# Patient Record
Sex: MALE | HISPANIC OR LATINO | Employment: FULL TIME | URBAN - METROPOLITAN AREA
[De-identification: names, ages, dates, MRNs, and addresses within clinical notes are randomized per-mention and may not be internally consistent; named-entity substitution may affect disease eponyms.]

---

## 2019-01-15 ENCOUNTER — OFFICE VISIT (OUTPATIENT)
Dept: URGENT CARE | Facility: PHYSICIAN GROUP | Age: 34
End: 2019-01-15
Payer: COMMERCIAL

## 2019-01-15 VITALS
RESPIRATION RATE: 18 BRPM | WEIGHT: 259 LBS | HEIGHT: 71 IN | OXYGEN SATURATION: 97 % | HEART RATE: 88 BPM | BODY MASS INDEX: 36.26 KG/M2 | TEMPERATURE: 97.9 F

## 2019-01-15 DIAGNOSIS — R51.9 ACUTE NONINTRACTABLE HEADACHE, UNSPECIFIED HEADACHE TYPE: ICD-10-CM

## 2019-01-15 DIAGNOSIS — J01.10 ACUTE NON-RECURRENT FRONTAL SINUSITIS: ICD-10-CM

## 2019-01-15 PROCEDURE — 99204 OFFICE O/P NEW MOD 45 MIN: CPT | Performed by: FAMILY MEDICINE

## 2019-01-15 RX ORDER — DIPHENHYDRAMINE HYDROCHLORIDE 50 MG/ML
25 INJECTION INTRAMUSCULAR; INTRAVENOUS ONCE
Status: COMPLETED | OUTPATIENT
Start: 2019-01-15 | End: 2019-01-15

## 2019-01-15 RX ORDER — ACETAMINOPHEN 500 MG
500-1000 TABLET ORAL EVERY 6 HOURS PRN
COMMUNITY
End: 2024-02-04

## 2019-01-15 RX ORDER — KETOROLAC TROMETHAMINE 30 MG/ML
60 INJECTION, SOLUTION INTRAMUSCULAR; INTRAVENOUS ONCE
Status: COMPLETED | OUTPATIENT
Start: 2019-01-15 | End: 2019-01-15

## 2019-01-15 RX ORDER — AMOXICILLIN AND CLAVULANATE POTASSIUM 875; 125 MG/1; MG/1
1 TABLET, FILM COATED ORAL 2 TIMES DAILY
Qty: 14 TAB | Refills: 0 | Status: SHIPPED | OUTPATIENT
Start: 2019-01-15 | End: 2019-01-22

## 2019-01-15 RX ORDER — IBUPROFEN 800 MG/1
800 TABLET ORAL EVERY 8 HOURS PRN
COMMUNITY
End: 2024-02-04

## 2019-01-15 RX ADMIN — KETOROLAC TROMETHAMINE 60 MG: 30 INJECTION, SOLUTION INTRAMUSCULAR; INTRAVENOUS at 10:24

## 2019-01-15 RX ADMIN — DIPHENHYDRAMINE HYDROCHLORIDE 25 MG: 50 INJECTION INTRAMUSCULAR; INTRAVENOUS at 10:23

## 2019-01-15 ASSESSMENT — ENCOUNTER SYMPTOMS
CHILLS: 0
SINUS PRESSURE: 1
BLURRED VISION: 0
FEVER: 0
SORE THROAT: 0
NAUSEA: 0
EYE WATERING: 0
INSOMNIA: 0
EYE REDNESS: 0
LOSS OF BALANCE: 0
SHORTNESS OF BREATH: 0
HEADACHES: 1
DIZZINESS: 0
EYE PAIN: 1
VOMITING: 0
MYALGIAS: 0

## 2019-01-15 NOTE — PROGRESS NOTES
Subjective:     Bao Savage is a 33 y.o. male who presents for Headache (R eye [id number])       Headache    This is a new problem. The current episode started in the past 7 days. The problem occurs constantly. The problem has been rapidly worsening. The pain is located in the right unilateral region. The quality of the pain is described as aching, pulsating and dull. The pain is severe. Associated symptoms include eye pain and sinus pressure. Pertinent negatives include no blurred vision, dizziness, drainage, ear pain, eye redness, eye watering, fever, hearing loss, insomnia, loss of balance, nausea, sore throat or vomiting.   History reviewed. No pertinent past medical history.History reviewed. No pertinent surgical history.  Social History     Social History   • Marital status:      Spouse name: N/A   • Number of children: N/A   • Years of education: N/A     Occupational History   • Not on file.     Social History Main Topics   • Smoking status: Current Every Day Smoker     Types: Cigarettes   • Smokeless tobacco: Never Used   • Alcohol use Yes   • Drug use: No   • Sexual activity: Not on file     Other Topics Concern   • Not on file     Social History Narrative   • No narrative on file      Family History   Problem Relation Age of Onset   • Heart Disease Mother    • Hypertension Mother    • Diabetes Mother    • Stroke Neg Hx     Review of Systems   Constitutional: Negative for chills and fever.   HENT: Positive for sinus pressure. Negative for ear pain, hearing loss and sore throat.    Eyes: Positive for pain. Negative for blurred vision and redness.   Respiratory: Negative for shortness of breath.    Cardiovascular: Negative for chest pain.   Gastrointestinal: Negative for nausea and vomiting.   Genitourinary: Negative for hematuria.   Musculoskeletal: Negative for myalgias.   Skin: Negative for rash.   Neurological: Positive for headaches. Negative for dizziness and loss of balance.  "  Psychiatric/Behavioral: The patient does not have insomnia.    No Known Allergies   Objective:   Pulse 88   Temp 36.6 °C (97.9 °F) (Temporal)   Resp 18   Ht 1.803 m (5' 11\")   Wt 117.5 kg (259 lb)   SpO2 97%   BMI 36.12 kg/m²   Physical Exam   Constitutional: He is oriented to person, place, and time. He appears well-developed and well-nourished. No distress.   HENT:   Head: Normocephalic and atraumatic.   Nose: Mucosal edema and rhinorrhea present. Right sinus exhibits maxillary sinus tenderness. Left sinus exhibits maxillary sinus tenderness.   Eyes: Pupils are equal, round, and reactive to light. Conjunctivae and EOM are normal.   Cardiovascular: Normal rate and regular rhythm.    No murmur heard.  Pulmonary/Chest: Effort normal and breath sounds normal. No respiratory distress.   Abdominal: Soft. He exhibits no distension. There is no tenderness.   Neurological: He is alert and oriented to person, place, and time. He has normal reflexes. No sensory deficit.   Skin: Skin is warm and dry.   Psychiatric: He has a normal mood and affect.   Vitals reviewed.        Assessment/Plan:   Assessment    1. Acute non-recurrent frontal sinusitis  - amoxicillin-clavulanate (AUGMENTIN) 875-125 MG Tab; Take 1 Tab by mouth 2 times a day for 7 days.  Dispense: 14 Tab; Refill: 0    2. Acute nonintractable headache, unspecified headache type  - ketorolac (TORADOL) injection 60 mg; 2 mL by Intramuscular route Once.  - prochlorperazine (COMPAZINE) injection 5 mg; 1 mL by Intramuscular route Once.  - diphenhydrAMINE (BENADRYL) injection 25 mg; 0.5 mL by Intravenous route Once.    Other orders  - asa/apap/caffeine (EXCEDRIN) 250-250-65 MG Tab; Take 1 Tab by mouth every 6 hours as needed for Headache.  - ibuprofen (MOTRIN) 800 MG Tab; Take 800 mg by mouth every 8 hours as needed.  - acetaminophen (TYLENOL) 500 MG Tab; Take 500-1,000 mg by mouth every 6 hours as needed.    Differential diagnosis, natural history, supportive care, " and indications for immediate follow-up discussed.

## 2019-01-15 NOTE — LETTER
January 15, 2019         Patient: Bao Savage   YOB: 1985   Date of Visit: 1/15/2019           To Whom it May Concern:    Bao Savage was seen in my clinic on 1/15/2019. He may return to work on 1/18/2019 unless improved prior..    If you have any questions or concerns, please don't hesitate to call.        Sincerely,           Korey Carrington M.D.  Electronically Signed

## 2019-01-15 NOTE — PATIENT INSTRUCTIONS

## 2019-02-18 ENCOUNTER — OFFICE VISIT (OUTPATIENT)
Dept: URGENT CARE | Facility: PHYSICIAN GROUP | Age: 34
End: 2019-02-18
Payer: COMMERCIAL

## 2019-02-18 VITALS
BODY MASS INDEX: 35.84 KG/M2 | RESPIRATION RATE: 20 BRPM | TEMPERATURE: 98 F | SYSTOLIC BLOOD PRESSURE: 124 MMHG | DIASTOLIC BLOOD PRESSURE: 80 MMHG | WEIGHT: 257 LBS | HEART RATE: 87 BPM | OXYGEN SATURATION: 96 %

## 2019-02-18 DIAGNOSIS — K64.5 THROMBOSED HEMORRHOIDS: ICD-10-CM

## 2019-02-18 PROCEDURE — 99213 OFFICE O/P EST LOW 20 MIN: CPT | Performed by: FAMILY MEDICINE

## 2019-02-18 RX ORDER — LIDOCAINE HCL 3 %
LOTION (ML) TOPICAL
Qty: 1 TUBE | Refills: 1 | Status: SHIPPED | OUTPATIENT
Start: 2019-02-18 | End: 2019-02-18

## 2019-02-18 RX ORDER — LIDOCAINE HCL 3 %
LOTION (ML) TOPICAL
Qty: 1 TUBE | Refills: 1 | Status: SHIPPED | OUTPATIENT
Start: 2019-02-18 | End: 2024-02-04

## 2019-02-18 ASSESSMENT — ENCOUNTER SYMPTOMS
WEIGHT LOSS: 0
FOCAL WEAKNESS: 0
EYE REDNESS: 0
EYE DISCHARGE: 0
SENSORY CHANGE: 0

## 2019-02-19 NOTE — PROGRESS NOTES
Subjective:      Bao Savage is a 33 y.o. male who presents with Anal Irritation (x7 days, bleeding started a week ago, discomfort started 2 weeks ago )            2 weeks anal pain and bleeding. Perianal bump. No fever. No purulent drainage. No difficulty with BM but bright red blood associated with BM. No itching. No PMH hemorrhoid.  No over-the-counter medications.  No other aggravating or alleviating factors.          Review of Systems   Constitutional: Negative for malaise/fatigue and weight loss.   Eyes: Negative for discharge and redness.   Skin: Negative for itching and rash.   Neurological: Negative for sensory change and focal weakness.     .  Medications, Allergies, and current problem list reviewed today in Epic       Objective:     /80   Pulse 87   Temp 36.7 °C (98 °F)   Resp 20   Wt 116.6 kg (257 lb)   SpO2 96%   BMI 35.84 kg/m²      Physical Exam   Constitutional: He is oriented to person, place, and time. He appears well-developed and well-nourished. No distress.   HENT:   Head: Normocephalic and atraumatic.   Genitourinary:   Genitourinary Comments: Perianal thrombosed hemorrhoid from 9 to 11:00.  Hemorrhoid has already started to open.  Anesthesia with 2% lidocaine with epi and then thrombus expressed.  Patient experienced relief.  No active bleeding.   Neurological: He is alert and oriented to person, place, and time.   Skin: Skin is warm and dry. No rash noted.               Assessment/Plan:     1. Thrombosed hemorrhoids  Lidocaine HCl 3 % Lotion    DISCONTINUED: Lidocaine HCl 3 % Lotion     Differential diagnosis, natural history, supportive care, and indications for immediate follow-up discussed at length.

## 2019-06-12 ENCOUNTER — OFFICE VISIT (OUTPATIENT)
Dept: URGENT CARE | Facility: PHYSICIAN GROUP | Age: 34
End: 2019-06-12
Payer: COMMERCIAL

## 2019-06-12 VITALS
DIASTOLIC BLOOD PRESSURE: 78 MMHG | BODY MASS INDEX: 36.4 KG/M2 | RESPIRATION RATE: 16 BRPM | HEART RATE: 77 BPM | TEMPERATURE: 99 F | OXYGEN SATURATION: 96 % | WEIGHT: 260 LBS | HEIGHT: 71 IN | SYSTOLIC BLOOD PRESSURE: 126 MMHG

## 2019-06-12 DIAGNOSIS — L02.412 ABSCESS OF LEFT AXILLA: ICD-10-CM

## 2019-06-12 DIAGNOSIS — J06.9 URI, ACUTE: ICD-10-CM

## 2019-06-12 DIAGNOSIS — R09.81 SINUS CONGESTION: ICD-10-CM

## 2019-06-12 PROCEDURE — 99214 OFFICE O/P EST MOD 30 MIN: CPT | Performed by: PHYSICIAN ASSISTANT

## 2019-06-12 RX ORDER — SULFAMETHOXAZOLE AND TRIMETHOPRIM 800; 160 MG/1; MG/1
1 TABLET ORAL 2 TIMES DAILY
Qty: 20 TAB | Refills: 0 | Status: SHIPPED | OUTPATIENT
Start: 2019-06-12 | End: 2024-02-04

## 2019-06-12 ASSESSMENT — ENCOUNTER SYMPTOMS: COUGH: 1

## 2019-06-12 NOTE — PROGRESS NOTES
Subjective:      Bao Savage is a 33 y.o. male who presents with Cough (congestion x 4 days) and Skin Lesion (in L axilla x 7 days)    PMH:  has no past medical history of Asthma; Cancer (HCC); Diabetes (HCC); Hyperlipidemia; or Hypertension.  MEDS:   Current Outpatient Prescriptions:   •  Pseudoeph-Doxylamine-DM-APAP (NYQUIL MULTI-SYMPTOM PO), Take  by mouth., Disp: , Rfl:   •  Lidocaine HCl 3 % Lotion, Apply to affected area every 4 hours as needed (Patient not taking: Reported on 6/12/2019), Disp: 1 Tube, Rfl: 1  •  asa/apap/caffeine (EXCEDRIN) 250-250-65 MG Tab, Take 1 Tab by mouth every 6 hours as needed for Headache., Disp: , Rfl:   •  ibuprofen (MOTRIN) 800 MG Tab, Take 800 mg by mouth every 8 hours as needed., Disp: , Rfl:   •  acetaminophen (TYLENOL) 500 MG Tab, Take 500-1,000 mg by mouth every 6 hours as needed., Disp: , Rfl:   ALLERGIES: No Known Allergies  SURGHX: No past surgical history on file.  SOCHX:  reports that he has been smoking Cigarettes.  He has never used smokeless tobacco. He reports that he drinks alcohol. He reports that he does not use drugs.  FH: Reviewed with patient, not pertinent to this visit.             Patient presents with:  Cough: congestion x 4 days  Skin Lesion: in L axilla x 7 days, draining on its own.  PT states he has had this pimple-like bump under his arm on and off for a year.  Patient states it drains occasionally on its own then goes away.  Patient states this is the longest it has lasted wanted to have it evaluated.      Patient denies fever, chills, nausea vomiting or diarrhea.      Cough   This is a new problem. The current episode started in the past 7 days. The problem has been unchanged. The problem occurs every few minutes. The cough is non-productive. Associated symptoms include headaches, nasal congestion, postnasal drip and rhinorrhea. Pertinent negatives include no chills, ear pain, fever, sore throat, shortness of breath or wheezing. The symptoms  "are aggravated by lying down. He has tried body position changes, OTC cough suppressant and rest for the symptoms. The treatment provided mild relief. There is no history of asthma or pneumonia.   Cyst   This is a new problem. The current episode started in the past 7 days. The problem occurs constantly. The problem has been gradually worsening. Associated symptoms include congestion, coughing and headaches. Pertinent negatives include no chills, fever or sore throat. Nothing aggravates the symptoms. He has tried nothing for the symptoms. The treatment provided no relief.       Review of Systems   Constitutional: Negative for chills and fever.   HENT: Positive for congestion, postnasal drip and rhinorrhea. Negative for ear pain and sore throat.    Respiratory: Positive for cough. Negative for sputum production, shortness of breath and wheezing.    Neurological: Positive for headaches.   All other systems reviewed and are negative.         Objective:     /78   Pulse 77   Temp 37.2 °C (99 °F)   Resp 16   Ht 1.803 m (5' 11\")   Wt 117.9 kg (260 lb)   SpO2 96%   BMI 36.26 kg/m²      Physical Exam   Constitutional: He is oriented to person, place, and time. He appears well-developed and well-nourished. No distress.   HENT:   Head: Normocephalic and atraumatic.   Right Ear: Tympanic membrane normal.   Left Ear: Tympanic membrane normal.   Nose: Mucosal edema and rhinorrhea present.   Mouth/Throat: Uvula is midline. Posterior oropharyngeal erythema present. No posterior oropharyngeal edema.   Eyes: Pupils are equal, round, and reactive to light. Conjunctivae and EOM are normal.   Neck: Normal range of motion. Neck supple.   Cardiovascular: Normal rate, regular rhythm and normal heart sounds.    Pulmonary/Chest: Effort normal and breath sounds normal. He has no decreased breath sounds.   Abdominal: Soft.   Musculoskeletal: Normal range of motion.   Lymphadenopathy:     He has no cervical adenopathy. "   Neurological: He is alert and oriented to person, place, and time. Gait normal.   Skin: Skin is warm and dry. Capillary refill takes less than 2 seconds.        Psychiatric: He has a normal mood and affect.   Nursing note and vitals reviewed.              Assessment/Plan:     1. Abscess of left axilla  sulfamethoxazole-trimethoprim (BACTRIM DS) 800-160 MG tablet   2. Sinus congestion  sulfamethoxazole-trimethoprim (BACTRIM DS) 800-160 MG tablet   3. URI, acute  sulfamethoxazole-trimethoprim (BACTRIM DS) 800-160 MG tablet     Discussed that I felt his cough/sinus congestion was viral in nature. Did not see any evidence of a bacterial process. Discussed natural progression and sx care.    PT can continue OTC medications, increase fluids and rest until symptoms improve.     Bactrim DS for axillary abscess. PT can use warm compress on affected area to encourage drainage.        PT should follow up with PCP in 1-2 days for re-evaluation if symptoms have not improved.  Discussed red flags and reasons to return to UC or ED.  Pt and/or family verbalized understanding of diagnosis and follow up instructions and was offered informational handout on diagnosis.  PT discharged.

## 2019-06-12 NOTE — LETTER
June 12, 2019         Patient: Bao Savage   YOB: 1985   Date of Visit: 6/12/2019           To Whom it May Concern:    Bao Savage was seen in my clinic on 6/12/2019. He may return to work on 6/13/2019.    If you have any questions or concerns, please don't hesitate to call.        Sincerely,           Mary Nina P.A.-C.  Electronically Signed

## 2019-06-15 ASSESSMENT — ENCOUNTER SYMPTOMS
CHILLS: 0
SORE THROAT: 0
SHORTNESS OF BREATH: 0
RHINORRHEA: 1
HEADACHES: 1
FEVER: 0
SPUTUM PRODUCTION: 0
WHEEZING: 0

## 2024-02-04 ENCOUNTER — OFFICE VISIT (OUTPATIENT)
Dept: URGENT CARE | Facility: PHYSICIAN GROUP | Age: 39
End: 2024-02-04
Payer: COMMERCIAL

## 2024-02-04 VITALS
WEIGHT: 268 LBS | DIASTOLIC BLOOD PRESSURE: 70 MMHG | BODY MASS INDEX: 38.37 KG/M2 | SYSTOLIC BLOOD PRESSURE: 130 MMHG | HEART RATE: 100 BPM | OXYGEN SATURATION: 97 % | TEMPERATURE: 99.3 F | RESPIRATION RATE: 16 BRPM | HEIGHT: 70 IN

## 2024-02-04 DIAGNOSIS — B96.89 BACTERIAL SINUSITIS: ICD-10-CM

## 2024-02-04 DIAGNOSIS — J32.9 BACTERIAL SINUSITIS: ICD-10-CM

## 2024-02-04 DIAGNOSIS — L02.419 ABSCESS, AXILLA: ICD-10-CM

## 2024-02-04 PROCEDURE — 99203 OFFICE O/P NEW LOW 30 MIN: CPT | Performed by: FAMILY MEDICINE

## 2024-02-04 PROCEDURE — 3075F SYST BP GE 130 - 139MM HG: CPT | Performed by: FAMILY MEDICINE

## 2024-02-04 PROCEDURE — 3078F DIAST BP <80 MM HG: CPT | Performed by: FAMILY MEDICINE

## 2024-02-04 RX ORDER — DOXYCYCLINE HYCLATE 100 MG
100 TABLET ORAL 2 TIMES DAILY
Qty: 14 TABLET | Refills: 0 | Status: SHIPPED | OUTPATIENT
Start: 2024-02-04 | End: 2024-02-11

## 2024-02-04 NOTE — PROGRESS NOTES
"  Subjective:      38 y.o. male presents to urgent care for cold symptoms that started about 2 weeks ago. He is experiencing headache increased sinus pressure, and cough. No sore throat. No tobacco product use. No history of asthma or COPD. He is vaccinated against COVID.     About 3 days ago he developed pain to his left axilla. He does have history of intermittent abscess to that area ,last drained in 2019. The pain is constant, it's described as throbbing, currently rated 4/10. There is associated discharge. He has been using cold and flu medication.      He denies any other questions or concerns at this time.    Current problem list, medication, and past medical/surgical history were reviewed in Epic.    ROS  See HPI     Objective:      /70   Pulse 100   Temp 37.4 °C (99.3 °F) (Temporal)   Resp 16   Ht 1.778 m (5' 10\")   Wt 122 kg (268 lb)   SpO2 97%   BMI 38.45 kg/m²     Physical Exam  Constitutional:       General: He is not in acute distress.     Appearance: He is not diaphoretic.   HENT:      Right Ear: Tympanic membrane, ear canal and external ear normal.      Left Ear: Tympanic membrane, ear canal and external ear normal.      Nose:      Right Sinus: Maxillary sinus tenderness present. No frontal sinus tenderness.      Left Sinus: Maxillary sinus tenderness present. No frontal sinus tenderness.      Mouth/Throat:      Tongue: Tongue does not deviate from midline.      Palate: No lesions.      Pharynx: Uvula midline. No oropharyngeal exudate or posterior oropharyngeal erythema.      Tonsils: No tonsillar exudate. 1+ on the right. 1+ on the left.   Cardiovascular:      Rate and Rhythm: Normal rate and regular rhythm.      Heart sounds: Normal heart sounds.   Pulmonary:      Effort: Pulmonary effort is normal. No respiratory distress.      Breath sounds: Normal breath sounds.   Skin:     Comments: Small open area to left axilla that is draining purulent matter.  No underlying area of induration.  " No surrounding erythema or edema.   Neurological:      Mental Status: He is alert.   Psychiatric:         Mood and Affect: Affect normal.         Judgment: Judgment normal.       Assessment/Plan:     1. Bacterial sinusitis  Criteria for bacterial sinusitis has been met.  Given the skin infection, I have sent in a prescription for doxycycline rather than Augmentin.  - doxycycline (VIBRAMYCIN) 100 MG Tab; Take 1 Tablet by mouth 2 times a day for 7 days.  Dispense: 14 Tablet; Refill: 0    2. Abscess, axilla  At this point, I do not believe he would benefit from incision and drainage.  Area is already open, no substantial palpable abscess beneath this.  He was started on doxycycline.  He was encouraged to use warm compresses.  Tylenol and ibuprofen as needed.  - doxycycline (VIBRAMYCIN) 100 MG Tab; Take 1 Tablet by mouth 2 times a day for 7 days.  Dispense: 14 Tablet; Refill: 0      Instructed to return to Urgent Care or nearest Emergency Department if symptoms fail to improve, for any change in condition, further concerns, or new concerning symptoms. Patient states understanding of the plan of care and discharge instructions.    Wendy Ding M.D.

## 2024-07-03 ENCOUNTER — OFFICE VISIT (OUTPATIENT)
Dept: MEDICAL GROUP | Facility: MEDICAL CENTER | Age: 39
End: 2024-07-03
Payer: COMMERCIAL

## 2024-07-03 VITALS
OXYGEN SATURATION: 95 % | SYSTOLIC BLOOD PRESSURE: 108 MMHG | WEIGHT: 264.11 LBS | DIASTOLIC BLOOD PRESSURE: 80 MMHG | TEMPERATURE: 98.1 F | HEART RATE: 63 BPM | BODY MASS INDEX: 37.81 KG/M2 | HEIGHT: 70 IN

## 2024-07-03 DIAGNOSIS — L73.2 HIDRADENITIS SUPPURATIVA OF LEFT AXILLA: ICD-10-CM

## 2024-07-03 DIAGNOSIS — L73.2: ICD-10-CM

## 2024-07-03 DIAGNOSIS — N50.811 TESTICULAR PAIN, RIGHT: ICD-10-CM

## 2024-07-03 DIAGNOSIS — E66.9 OBESITY (BMI 30-39.9): ICD-10-CM

## 2024-07-03 DIAGNOSIS — R21 RASH AND NONSPECIFIC SKIN ERUPTION: ICD-10-CM

## 2024-07-03 PROCEDURE — 3079F DIAST BP 80-89 MM HG: CPT | Performed by: STUDENT IN AN ORGANIZED HEALTH CARE EDUCATION/TRAINING PROGRAM

## 2024-07-03 PROCEDURE — 3074F SYST BP LT 130 MM HG: CPT | Performed by: STUDENT IN AN ORGANIZED HEALTH CARE EDUCATION/TRAINING PROGRAM

## 2024-07-03 PROCEDURE — 99214 OFFICE O/P EST MOD 30 MIN: CPT | Performed by: STUDENT IN AN ORGANIZED HEALTH CARE EDUCATION/TRAINING PROGRAM

## 2024-07-03 RX ORDER — TRIAMCINOLONE ACETONIDE 1 MG/G
1 CREAM TOPICAL 2 TIMES DAILY
Qty: 45 G | Refills: 1 | Status: SHIPPED | OUTPATIENT
Start: 2024-07-03

## 2024-07-03 RX ORDER — DOXYCYCLINE HYCLATE 100 MG
100 TABLET ORAL 2 TIMES DAILY
Qty: 180 TABLET | Refills: 1 | Status: SHIPPED | OUTPATIENT
Start: 2024-07-03

## 2024-07-03 RX ORDER — METFORMIN HYDROCHLORIDE 500 MG/1
500 TABLET, EXTENDED RELEASE ORAL DAILY
Qty: 90 TABLET | Refills: 3 | Status: SHIPPED | OUTPATIENT
Start: 2024-07-03

## 2024-07-03 RX ORDER — CLINDAMYCIN PHOSPHATE 10 MG/G
1 GEL TOPICAL 2 TIMES DAILY
Qty: 60 G | Refills: 5 | Status: SHIPPED | OUTPATIENT
Start: 2024-07-03

## 2024-07-03 ASSESSMENT — PATIENT HEALTH QUESTIONNAIRE - PHQ9: CLINICAL INTERPRETATION OF PHQ2 SCORE: 0

## 2024-07-03 ASSESSMENT — ENCOUNTER SYMPTOMS
HEADACHES: 0
NAUSEA: 0
VOMITING: 0
WHEEZING: 0
PALPITATIONS: 0
FEVER: 0
WEIGHT LOSS: 0
DIZZINESS: 0
CHILLS: 0
SHORTNESS OF BREATH: 0

## 2024-07-08 ENCOUNTER — OFFICE VISIT (OUTPATIENT)
Dept: DERMATOLOGY | Facility: IMAGING CENTER | Age: 39
End: 2024-07-08
Payer: COMMERCIAL

## 2024-07-08 DIAGNOSIS — B07.9 VERRUCA: ICD-10-CM

## 2024-07-08 DIAGNOSIS — L73.2 HIDRADENITIS: ICD-10-CM

## 2024-07-08 PROCEDURE — 17110 DESTRUCTION B9 LES UP TO 14: CPT | Performed by: STUDENT IN AN ORGANIZED HEALTH CARE EDUCATION/TRAINING PROGRAM

## 2024-07-08 PROCEDURE — 99204 OFFICE O/P NEW MOD 45 MIN: CPT | Mod: 25 | Performed by: STUDENT IN AN ORGANIZED HEALTH CARE EDUCATION/TRAINING PROGRAM

## 2024-07-08 PROCEDURE — 11900 INJECT SKIN LESIONS </W 7: CPT | Mod: 59 | Performed by: STUDENT IN AN ORGANIZED HEALTH CARE EDUCATION/TRAINING PROGRAM

## 2024-07-08 RX ORDER — TRIAMCINOLONE ACETONIDE 40 MG/ML
40 INJECTION, SUSPENSION INTRA-ARTICULAR; INTRAMUSCULAR ONCE
Status: COMPLETED | OUTPATIENT
Start: 2024-07-08 | End: 2024-07-08

## 2024-07-08 RX ORDER — IMIQUIMOD 12.5 MG/.25G
CREAM TOPICAL
Qty: 24 EACH | Refills: 0 | Status: SHIPPED | OUTPATIENT
Start: 2024-07-08

## 2024-07-08 RX ADMIN — TRIAMCINOLONE ACETONIDE 40 MG: 40 INJECTION, SUSPENSION INTRA-ARTICULAR; INTRAMUSCULAR at 17:14

## 2024-07-20 ENCOUNTER — HOSPITAL ENCOUNTER (OUTPATIENT)
Dept: LAB | Facility: MEDICAL CENTER | Age: 39
End: 2024-07-20
Attending: STUDENT IN AN ORGANIZED HEALTH CARE EDUCATION/TRAINING PROGRAM
Payer: COMMERCIAL

## 2024-07-20 DIAGNOSIS — E66.9 OBESITY (BMI 30-39.9): ICD-10-CM

## 2024-07-20 LAB
ALBUMIN SERPL BCP-MCNC: 4.3 G/DL (ref 3.2–4.9)
ALBUMIN/GLOB SERPL: 1.4 G/DL
ALP SERPL-CCNC: 87 U/L (ref 30–99)
ALT SERPL-CCNC: 30 U/L (ref 2–50)
ANION GAP SERPL CALC-SCNC: 11 MMOL/L (ref 7–16)
AST SERPL-CCNC: 12 U/L (ref 12–45)
BILIRUB SERPL-MCNC: 1.1 MG/DL (ref 0.1–1.5)
BUN SERPL-MCNC: 16 MG/DL (ref 8–22)
CALCIUM ALBUM COR SERPL-MCNC: 9 MG/DL (ref 8.5–10.5)
CALCIUM SERPL-MCNC: 9.2 MG/DL (ref 8.5–10.5)
CHLORIDE SERPL-SCNC: 99 MMOL/L (ref 96–112)
CHOLEST SERPL-MCNC: 194 MG/DL (ref 100–199)
CO2 SERPL-SCNC: 27 MMOL/L (ref 20–33)
CREAT SERPL-MCNC: 0.63 MG/DL (ref 0.5–1.4)
ERYTHROCYTE [DISTWIDTH] IN BLOOD BY AUTOMATED COUNT: 43.1 FL (ref 35.9–50)
EST. AVERAGE GLUCOSE BLD GHB EST-MCNC: 94 MG/DL
GFR SERPLBLD CREATININE-BSD FMLA CKD-EPI: 124 ML/MIN/1.73 M 2
GLOBULIN SER CALC-MCNC: 3 G/DL (ref 1.9–3.5)
GLUCOSE SERPL-MCNC: 90 MG/DL (ref 65–99)
HBA1C MFR BLD: 4.9 % (ref 4–5.6)
HCT VFR BLD AUTO: 47.4 % (ref 42–52)
HDLC SERPL-MCNC: 44 MG/DL
HGB BLD-MCNC: 17 G/DL (ref 14–18)
LDLC SERPL CALC-MCNC: 125 MG/DL
MCH RBC QN AUTO: 31.4 PG (ref 27–33)
MCHC RBC AUTO-ENTMCNC: 35.9 G/DL (ref 32.3–36.5)
MCV RBC AUTO: 87.6 FL (ref 81.4–97.8)
PLATELET # BLD AUTO: 242 K/UL (ref 164–446)
PMV BLD AUTO: 11.1 FL (ref 9–12.9)
POTASSIUM SERPL-SCNC: 4.6 MMOL/L (ref 3.6–5.5)
PROT SERPL-MCNC: 7.3 G/DL (ref 6–8.2)
RBC # BLD AUTO: 5.41 M/UL (ref 4.7–6.1)
SODIUM SERPL-SCNC: 137 MMOL/L (ref 135–145)
TRIGL SERPL-MCNC: 127 MG/DL (ref 0–149)
TSH SERPL DL<=0.005 MIU/L-ACNC: 1.16 UIU/ML (ref 0.38–5.33)
WBC # BLD AUTO: 7.3 K/UL (ref 4.8–10.8)

## 2024-07-20 PROCEDURE — 80061 LIPID PANEL: CPT

## 2024-07-20 PROCEDURE — 80053 COMPREHEN METABOLIC PANEL: CPT

## 2024-07-20 PROCEDURE — 84443 ASSAY THYROID STIM HORMONE: CPT

## 2024-07-20 PROCEDURE — 36415 COLL VENOUS BLD VENIPUNCTURE: CPT

## 2024-07-20 PROCEDURE — 85027 COMPLETE CBC AUTOMATED: CPT

## 2024-07-20 PROCEDURE — 83036 HEMOGLOBIN GLYCOSYLATED A1C: CPT

## 2024-08-07 ENCOUNTER — HOSPITAL ENCOUNTER (OUTPATIENT)
Dept: RADIOLOGY | Facility: MEDICAL CENTER | Age: 39
End: 2024-08-07
Attending: STUDENT IN AN ORGANIZED HEALTH CARE EDUCATION/TRAINING PROGRAM
Payer: COMMERCIAL

## 2024-08-07 DIAGNOSIS — N50.811 TESTICULAR PAIN, RIGHT: ICD-10-CM

## 2024-08-07 PROCEDURE — 76870 US EXAM SCROTUM: CPT

## 2024-08-09 ENCOUNTER — TELEPHONE (OUTPATIENT)
Dept: MEDICAL GROUP | Facility: MEDICAL CENTER | Age: 39
End: 2024-08-09
Payer: COMMERCIAL

## 2024-08-09 NOTE — TELEPHONE ENCOUNTER
Phone Number Called: [phone] (work)      Call outcome:  spoke to wife    Message: patient wife received the results. Wife is wondering what the other findings are?

## 2024-08-09 NOTE — TELEPHONE ENCOUNTER
----- Message from Heriberto Pride M.D. sent at 8/8/2024  7:47 AM PDT -----  Overall ultrasound of scrotum was reassuring. They did identify some minor things which we can discuss in our next visit in October    Thank you  Dr. Pride

## 2024-08-09 NOTE — TELEPHONE ENCOUNTER
----- Message from Heriberto Pride M.D. sent at 8/8/2024  7:47 AM PDT -----  Overall ultrasound of scrotum was reassuring. They did identify some minor things which we can discuss in our next visit in October    Thank you  Dr. Pride   
Phone Number Called: [phone] (work)      Call outcome: Did not leave a detailed message. Requested patient to call back.    Message: left vm   
no

## 2024-10-03 PROBLEM — B07.9 VERRUCA VULGARIS: Status: ACTIVE | Noted: 2024-10-03

## 2024-10-03 PROBLEM — L73.2 HIDRADENITIS SUPPURATIVA: Status: ACTIVE | Noted: 2024-10-03

## 2024-11-07 ENCOUNTER — OFFICE VISIT (OUTPATIENT)
Dept: MEDICAL GROUP | Facility: MEDICAL CENTER | Age: 39
End: 2024-11-07
Payer: COMMERCIAL

## 2024-11-07 VITALS
OXYGEN SATURATION: 96 % | RESPIRATION RATE: 22 BRPM | TEMPERATURE: 98.2 F | BODY MASS INDEX: 39.1 KG/M2 | SYSTOLIC BLOOD PRESSURE: 112 MMHG | WEIGHT: 273.15 LBS | HEIGHT: 70 IN | DIASTOLIC BLOOD PRESSURE: 78 MMHG | HEART RATE: 64 BPM

## 2024-11-07 DIAGNOSIS — R10.31 RIGHT LOWER QUADRANT PAIN: ICD-10-CM

## 2024-11-07 DIAGNOSIS — E78.5 DYSLIPIDEMIA: ICD-10-CM

## 2024-11-07 DIAGNOSIS — L73.2 HIDRADENITIS SUPPURATIVA: ICD-10-CM

## 2024-11-07 DIAGNOSIS — E66.01 SEVERE OBESITY (HCC): ICD-10-CM

## 2024-11-07 DIAGNOSIS — N50.811 TESTICULAR PAIN, RIGHT: ICD-10-CM

## 2024-11-07 PROCEDURE — 3074F SYST BP LT 130 MM HG: CPT | Performed by: STUDENT IN AN ORGANIZED HEALTH CARE EDUCATION/TRAINING PROGRAM

## 2024-11-07 PROCEDURE — 3078F DIAST BP <80 MM HG: CPT | Performed by: STUDENT IN AN ORGANIZED HEALTH CARE EDUCATION/TRAINING PROGRAM

## 2024-11-07 PROCEDURE — 99214 OFFICE O/P EST MOD 30 MIN: CPT | Performed by: STUDENT IN AN ORGANIZED HEALTH CARE EDUCATION/TRAINING PROGRAM

## 2024-11-07 ASSESSMENT — ENCOUNTER SYMPTOMS
NAUSEA: 0
WHEEZING: 0
VOMITING: 0
DIZZINESS: 0
PALPITATIONS: 0
HEADACHES: 0
WEIGHT LOSS: 0
SHORTNESS OF BREATH: 0
FEVER: 0
CHILLS: 0

## 2024-11-07 ASSESSMENT — FIBROSIS 4 INDEX: FIB4 SCORE: 0.35

## 2024-11-07 NOTE — PROGRESS NOTES
Subjective:     CC:     HPI:   Bao presents today with    Verbal consent was acquired by the patient to use [employer] ambient listening note generation during this visit Yes   History of Present Illness  The patient presents for evaluation of multiple medical concerns. He is accompanied by an adult female.    He has been experiencing pain in the right lower quadrant since Monday morning, which he describes as a pressure sensation accompanied by mild nausea. This is a new sensation for him. He maintains regular eating habits and bowel movements. The pain is not present when standing but becomes noticeable upon sitting. He reports no numbness, fever, or chills. The pain is not severe but is concerning due to its novelty. He is seeking reassurance that it is not indicative of a serious condition such as appendicitis or gallbladder disease. The pain is only present when pressure is applied to the area.    He has been under the care of a dermatologist who administered a steroid injection, which initially helped to dry up a bump behind his ear. However, a similar bump under his armpit, which had also dried up, has reappeared. He has an upcoming appointment with the dermatologist on 11/12/2024. He has completed a course of antibiotics and is currently using clindamycin gel. The dermatologist has suggested surgical removal of the sac if the bump recurs.    FAMILY HISTORY  His mother has congestive heart failure since her late 40s or early 50s. She is on dialysis and has kidney failure because of uncontrolled diabetes. Diabetes runs on his mother's side of the family, pretty much all uncles and aunts have it.        Health Maintenance:     ROS:  Review of Systems   Constitutional:  Negative for chills, fever and weight loss.   HENT:  Negative for hearing loss.    Respiratory:  Negative for shortness of breath and wheezing.    Cardiovascular:  Negative for chest pain and palpitations.   Gastrointestinal:  Negative for  "nausea and vomiting.   Genitourinary:  Negative for frequency and urgency.   Skin:  Negative for rash.   Neurological:  Negative for dizziness and headaches.       Objective:     Exam:  /78 (BP Location: Left arm)   Pulse 64   Temp 36.8 °C (98.2 °F)   Resp (!) 22   Ht 1.778 m (5' 10\")   Wt 124 kg (273 lb 2.4 oz)   SpO2 96%   BMI 39.19 kg/m²  Body mass index is 39.19 kg/m².    Physical Exam  Constitutional:       Appearance: Normal appearance.   Cardiovascular:      Rate and Rhythm: Normal rate and regular rhythm.   Pulmonary:      Effort: Pulmonary effort is normal.      Breath sounds: Normal breath sounds.   Abdominal:      General: There is no distension.      Palpations: Abdomen is soft. There is no mass.      Tenderness: There is no abdominal tenderness. There is no guarding or rebound.      Hernia: No hernia is present.   Musculoskeletal:      Cervical back: Normal range of motion and neck supple.   Lymphadenopathy:      Cervical: No cervical adenopathy.   Neurological:      Mental Status: He is alert.         Labs:   Results  Laboratory Studies  Electrolytes are fine. Liver enzymes were not elevated. White blood cell, red blood cell, platelet were within normal limit. Cholesterol is mildly elevated. LDL cholesterol of 125. Blood sugar looks quite good.    Imaging  Scrotal ultrasound was fairly normal, no torsion, no mass, but varicocele was noted.        Assessment & Plan:     39 y.o. male with the following -     1. Hidradenitis suppurativa  - he completed initial course of doxycycline and topical clindamycin, did not refill medication.   2. Right lower quadrant pain  3. Testicular pain, right    - CBC WITH DIFFERENTIAL; Future  - US-ABDOMEN COMPLETE SURVEY; Future  - Comp Metabolic Panel; Future  - URINALYSIS,CULTURE IF INDICATED; Future    4. Dyslipidemia  5. Severe obesity (HCC)  6. Body mass index (BMI) of 39.0-39.9 in adult  Chronic stable  Lifestyle controlled  Declines additional lab " study/ assessment.     Assessment & Plan  1. Hidradenitis suppurativa.  He was advised to continue the use of clindamycin gel and doxycycline. He has an appointment with the [facility] dermatologist on 11/12/2024 for further evaluation. If the condition persists, surgical removal of the sac may be necessary.    2. Right lower quadrant pain.  3. Testicular pain  The pain is nonspecific, though localized in RLQ for past 3 days. Described as pressure not pain. No particular pattern except it improves with standing. No association with food. He is eating and having reg bm. Last bm this am.   Vital signs stable, abdominal exam benign. Mild tenderness/ to palpation in RLQ of abdomen.    A blood test and ultrasound will be ordered.    The scrotal ultrasound showed a dilated vein, but no torsion or mass was detected. No treatment is necessary unless it causes significant pain.              Return if symptoms worsen or fail to improve.    Please note that this dictation was created using voice recognition software. I have made every reasonable attempt to correct obvious errors, but I expect that there are errors of grammar and possibly content that I did not discover before finalizing the note.

## 2024-11-12 ENCOUNTER — OFFICE VISIT (OUTPATIENT)
Dept: DERMATOLOGY | Facility: IMAGING CENTER | Age: 39
End: 2024-11-12
Payer: COMMERCIAL

## 2024-11-12 DIAGNOSIS — B07.9 VERRUCA: ICD-10-CM

## 2024-11-12 DIAGNOSIS — L73.2 HIDRADENITIS SUPPURATIVA OF LEFT AXILLA: ICD-10-CM

## 2024-11-12 DIAGNOSIS — L73.2 HIDRADENITIS: ICD-10-CM

## 2024-11-12 PROCEDURE — 11900 INJECT SKIN LESIONS </W 7: CPT | Performed by: STUDENT IN AN ORGANIZED HEALTH CARE EDUCATION/TRAINING PROGRAM

## 2024-11-12 PROCEDURE — 99214 OFFICE O/P EST MOD 30 MIN: CPT | Mod: 25 | Performed by: STUDENT IN AN ORGANIZED HEALTH CARE EDUCATION/TRAINING PROGRAM

## 2024-11-12 RX ORDER — DOXYCYCLINE HYCLATE 100 MG
100 TABLET ORAL 2 TIMES DAILY
Qty: 180 TABLET | Refills: 1 | Status: SHIPPED | OUTPATIENT
Start: 2024-11-12

## 2024-11-12 RX ORDER — CLINDAMYCIN PHOSPHATE 10 MG/G
1 GEL TOPICAL 2 TIMES DAILY
Qty: 60 G | Refills: 5 | Status: SHIPPED | OUTPATIENT
Start: 2024-11-12

## 2024-11-12 RX ORDER — TRIAMCINOLONE ACETONIDE 40 MG/ML
20 INJECTION, SUSPENSION INTRA-ARTICULAR; INTRAMUSCULAR ONCE
Status: COMPLETED | OUTPATIENT
Start: 2024-11-12 | End: 2024-11-12

## 2024-11-12 RX ADMIN — TRIAMCINOLONE ACETONIDE 20 MG: 40 INJECTION, SUSPENSION INTRA-ARTICULAR; INTRAMUSCULAR at 09:38

## 2024-11-12 NOTE — PROGRESS NOTES
[facility]at[facility] Clinic Note    Chief Complaint   Patient presents with    Follow-Up         HPI:    Here for follow up of HS as well as flat warts.     At last visit, for HS was started on doxy 100 mg twice daily as well as topical clindamycin. Draining lesions x 3 were treated with ILK 40mg/cc. Notes improvement in lesion in posterior ear, is now just a scar. However, stopped the doxy about 1 mo ago and has had resurgence/flare of lesion on left axillae.  Would like to repeat ILK and interested in surgical options.     For warts, had LN2 to 6 lesions and then started on topical imiquimod. Warts all cleared since last visit. No recurrence.     Notes new cyst on chest that is not painful x 1 week. Has not drained.        HS history:   Started years ago with small pimple that drained in left armpit. Then in May, got a lesion behind ear that drained blood. Now has pin sized hole that continues to drain. Was sent doxycycline 100 mg and clindmycin 1% solution but has not started yet. Doesn't affect right axillae or groin.       ROS: no fevers/chills. Other pertinent positives and negatives as above.     Meds/PMH/PSH/FamHx/Allergies: reviewed         PHYSICAL EXAM/IMPRESSION / PLAN:  A focused skin exam was completed of the hands, axillae, neck with the following pertinent findings listed below.   Remaining above-listed examined areas within normal limits / negative for rashes or lesions.      Hidradenitis suppurativa, Stage 2   Exam: Right post auricular ear with fibrotic long tract NO drainage today - inactive; left axillae with central subcutaneous firm draining and tender linear tract approx 3cm      - educated patient about diagnosis, management options, and expectations of treatment  - discussed bathing habits, continue BPO wash 10%, warned can bleach clothing   - re-start doxycycline 100mg twice daily  -Instructed to take with food & water.   -Side effects including, but not limited to, GI upset,  photosensitivity (and need for photoprotection) discussed.   - c/w topical clindamycin 1% solution BID to affected area   - discussed benefit of intralesional steroids for acutely inflamed lesions,  proceed with treatment today, see procedure note  - discussed surgical excision today; given active and recurrent inflammation     INTRALESIONAL KENALOG:  Discussed risks and benefits of intralesional kenalog injections, including skin atrophy, discoloration, minimal risk of infection. Patient verbally agreed. ILK 40mg/cc x 0.5cc injected into the lesion on the left axillae n=1 areas. Patient tolerated procedure well, no complications. Aftercare discussed.       Verruca Vulgaris, n =6, right hand - resolved s/p LN2 and imiquimod   Exam: Right dorsal hand with post inflammatory macules   - call if recurs       Follow up:  for HS excision once PA approved       Crista Orr MD   [facility]

## 2025-03-10 ENCOUNTER — OFFICE VISIT (OUTPATIENT)
Dept: MEDICAL GROUP | Facility: MEDICAL CENTER | Age: 40
End: 2025-03-10
Payer: COMMERCIAL

## 2025-03-10 VITALS
DIASTOLIC BLOOD PRESSURE: 62 MMHG | RESPIRATION RATE: 17 BRPM | SYSTOLIC BLOOD PRESSURE: 132 MMHG | OXYGEN SATURATION: 97 % | TEMPERATURE: 99.3 F | HEART RATE: 79 BPM | WEIGHT: 260 LBS | BODY MASS INDEX: 37.22 KG/M2 | HEIGHT: 70 IN

## 2025-03-10 DIAGNOSIS — R10.32 ABDOMINAL PAIN, BILATERAL LOWER QUADRANT: ICD-10-CM

## 2025-03-10 DIAGNOSIS — E66.01 SEVERE OBESITY (HCC): ICD-10-CM

## 2025-03-10 DIAGNOSIS — R10.31 ABDOMINAL PAIN, BILATERAL LOWER QUADRANT: ICD-10-CM

## 2025-03-10 DIAGNOSIS — E78.5 DYSLIPIDEMIA: ICD-10-CM

## 2025-03-10 PROCEDURE — 99214 OFFICE O/P EST MOD 30 MIN: CPT | Performed by: STUDENT IN AN ORGANIZED HEALTH CARE EDUCATION/TRAINING PROGRAM

## 2025-03-10 PROCEDURE — 3075F SYST BP GE 130 - 139MM HG: CPT | Performed by: STUDENT IN AN ORGANIZED HEALTH CARE EDUCATION/TRAINING PROGRAM

## 2025-03-10 PROCEDURE — 3078F DIAST BP <80 MM HG: CPT | Performed by: STUDENT IN AN ORGANIZED HEALTH CARE EDUCATION/TRAINING PROGRAM

## 2025-03-10 ASSESSMENT — FIBROSIS 4 INDEX: FIB4 SCORE: 0.35

## 2025-03-10 ASSESSMENT — ENCOUNTER SYMPTOMS
HEADACHES: 0
FEVER: 0
PALPITATIONS: 0
WHEEZING: 0
WEIGHT LOSS: 0
CHILLS: 0
DIZZINESS: 0
SHORTNESS OF BREATH: 0
VOMITING: 0
NAUSEA: 0

## 2025-03-10 NOTE — PROGRESS NOTES
Subjective:     CC:     HPI:   Bao presents today with    Last seen 11/7/2024  past history of hidradenitis, right lower quadrant pain, testicular pain, hyperlipidemia, elevated BMI, left-sided varicocele    -CBC, CMP, urinalysis, ultrasound abdomen were ordered however not done    RLQ/LLQ pain typically while sitting down   Not while standing   Constant while sitting   Started 1 month ago  No associated food and having regular bowel movement     Verbal consent was acquired by the patient to use [employer] ambient listening note generation during this visit Yes   History of Present Illness  The patient presents to follow up on abdominal pain.    He was last seen 4 months ago for right lower quadrant pain accompanied by mild nausea. He continues to have regular eating habits and bowel movements. He was referred for an ultrasound but did not go because the pain had subsided. However, the pain recurred a month ago. Initially localized to the left side, the pain has now spread to both the right and left sides. It is more pronounced when he is seated, particularly on the toilet, and less so when he is standing or lying down. The pain is described as constant pressure, akin to a bruise, and is rated as a 5 on a scale of 1 to 10. His diet remains unchanged, and he maintains regular bowel movements. He has been more active recently, engaging in walking exercises. He reports no presence of blood in his stools or any heavy lifting that could have triggered the pain. He also reports no changes in his testicles. He is not currently on any medications but takes supplements for liver health. He typically experiences discomfort upon waking and immediately using the restroom. He does not engage in stretching exercises but walks approximately a mile in the afternoons. His work involves significant physical activity, including walking around a property throughout the day. He reports no hip pain.          Health Maintenance:  "    ROS:  Review of Systems   Constitutional:  Negative for chills, fever and weight loss.   HENT:  Negative for hearing loss.    Respiratory:  Negative for shortness of breath and wheezing.    Cardiovascular:  Negative for chest pain and palpitations.   Gastrointestinal:  Negative for nausea and vomiting.   Genitourinary:  Negative for frequency and urgency.   Skin:  Negative for rash.   Neurological:  Negative for dizziness and headaches.       Objective:     Exam:  /62 (BP Location: Right arm, Patient Position: Sitting, BP Cuff Size: Large adult)   Pulse 79   Temp 37.4 °C (99.3 °F) (Temporal)   Resp 17   Ht 1.778 m (5' 10\")   Wt 118 kg (260 lb)   SpO2 97%   BMI 37.31 kg/m²  Body mass index is 37.31 kg/m².    Physical Exam  Abdominal:      General: Abdomen is flat. There is no distension.      Palpations: Abdomen is soft. There is no mass.      Tenderness: There is no abdominal tenderness. There is no guarding or rebound.      Hernia: No hernia is present.             Labs:     Assessment & Plan:     39 y.o. male with the following -     1. Abdominal pain, bilateral lower quadrant  Chronic intermittent bilateral lower quadrant pain  Etiology unknown  No associated change in bowel movements, no blood in stool, not associated with food, no history of urinary stones, no hernia on examinations.  Symptoms seem to be triggered when sitting and leaning forward  Differential intra-abdominal causes versus MSK  Plan  At this time recommend obtaining basic blood work CBC, CMP, UA and ultrasound abdomen  Also recommend stretches of lower back thighs piriformis  May consider referral to gastroenterology for colonoscopy versus CT  - CBC WITH DIFFERENTIAL; Future  - Comp Metabolic Panel; Future  - URINALYSIS,CULTURE IF INDICATED; Future  - US-ABDOMEN COMPLETE SURVEY; Future    2. Severe obesity (HCC)  3. Body mass index (BMI) of 37.0-37.9 in adult  Chronic, stable overall weight loss of 13 pounds however this is " expected patient has been working actively with significant other on diet and exercise    4. Dyslipidemia  Noted on prior lab LDL of 125.  Triglyceride HDL ratio of around less than 30Dictation #1  MRN:[id number]  [facility]:[id number]       HCC Gap Form    Diagnosis: E66.01 - Severe obesity (HCC)  Z68.37 - Body mass index (BMI) of 37.0-37.9 in adult  Comorbidity Diagnosis: Dyslipidemia  The current BMI is 37.31 kg/m² as of 03/10/25.    Past BMI Values:  03/10/25 : 37.31 kg/m². 11/07/24 : 39.19 kg/m². 07/03/24 : 37.90 kg/m².   Assessment and plan: Chronic, improving. Encouraged healthy diet and physical activity changes with a goal of weight loss. Follow up at least annually. The comorbid condition is stable based on today's assessment. Continue current treatment plan. Follow up at least yearly. Continue lifestyle changes, daily exercise    Last edited 03/10/25 12:43 PDT by Heriberto Pride M.D.           Return in about 3 months (around 6/10/2025) for Lab review, Med check.    Please note that this dictation was created using voice recognition software. I have made every reasonable attempt to correct obvious errors, but I expect that there are errors of grammar and possibly content that I did not discover before finalizing the note.

## 2025-03-17 ENCOUNTER — HOSPITAL ENCOUNTER (OUTPATIENT)
Dept: LAB | Facility: MEDICAL CENTER | Age: 40
End: 2025-03-17
Attending: STUDENT IN AN ORGANIZED HEALTH CARE EDUCATION/TRAINING PROGRAM
Payer: COMMERCIAL

## 2025-03-17 DIAGNOSIS — R10.32 ABDOMINAL PAIN, BILATERAL LOWER QUADRANT: ICD-10-CM

## 2025-03-17 DIAGNOSIS — R10.31 ABDOMINAL PAIN, BILATERAL LOWER QUADRANT: ICD-10-CM

## 2025-03-17 LAB
BASOPHILS # BLD AUTO: 0.5 % (ref 0–1.8)
BASOPHILS # BLD: 0.04 K/UL (ref 0–0.12)
EOSINOPHIL # BLD AUTO: 0.22 K/UL (ref 0–0.51)
EOSINOPHIL NFR BLD: 2.9 % (ref 0–6.9)
ERYTHROCYTE [DISTWIDTH] IN BLOOD BY AUTOMATED COUNT: 43.2 FL (ref 35.9–50)
HCT VFR BLD AUTO: 47.7 % (ref 42–52)
HGB BLD-MCNC: 16.5 G/DL (ref 14–18)
IMM GRANULOCYTES # BLD AUTO: 0.03 K/UL (ref 0–0.11)
IMM GRANULOCYTES NFR BLD AUTO: 0.4 % (ref 0–0.9)
LYMPHOCYTES # BLD AUTO: 3.31 K/UL (ref 1–4.8)
LYMPHOCYTES NFR BLD: 43.7 % (ref 22–41)
MCH RBC QN AUTO: 30.5 PG (ref 27–33)
MCHC RBC AUTO-ENTMCNC: 34.6 G/DL (ref 32.3–36.5)
MCV RBC AUTO: 88.2 FL (ref 81.4–97.8)
MONOCYTES # BLD AUTO: 0.42 K/UL (ref 0–0.85)
MONOCYTES NFR BLD AUTO: 5.5 % (ref 0–13.4)
NEUTROPHILS # BLD AUTO: 3.56 K/UL (ref 1.82–7.42)
NEUTROPHILS NFR BLD: 47 % (ref 44–72)
NRBC # BLD AUTO: 0 K/UL
NRBC BLD-RTO: 0 /100 WBC (ref 0–0.2)
PLATELET # BLD AUTO: 252 K/UL (ref 164–446)
PMV BLD AUTO: 10.5 FL (ref 9–12.9)
RBC # BLD AUTO: 5.41 M/UL (ref 4.7–6.1)
WBC # BLD AUTO: 7.6 K/UL (ref 4.8–10.8)

## 2025-03-17 PROCEDURE — 85025 COMPLETE CBC W/AUTO DIFF WBC: CPT

## 2025-03-17 PROCEDURE — 36415 COLL VENOUS BLD VENIPUNCTURE: CPT

## 2025-03-17 PROCEDURE — 80053 COMPREHEN METABOLIC PANEL: CPT

## 2025-03-18 ENCOUNTER — RESULTS FOLLOW-UP (OUTPATIENT)
Dept: MEDICAL GROUP | Facility: MEDICAL CENTER | Age: 40
End: 2025-03-18
Payer: COMMERCIAL

## 2025-03-18 DIAGNOSIS — R74.8 LIVER ENZYME ELEVATION: ICD-10-CM

## 2025-03-18 LAB
ALBUMIN SERPL BCP-MCNC: 4.2 G/DL (ref 3.2–4.9)
ALBUMIN/GLOB SERPL: 1.4 G/DL
ALP SERPL-CCNC: 94 U/L (ref 30–99)
ALT SERPL-CCNC: 64 U/L (ref 2–50)
ANION GAP SERPL CALC-SCNC: 11 MMOL/L (ref 7–16)
AST SERPL-CCNC: 35 U/L (ref 12–45)
BILIRUB SERPL-MCNC: 0.5 MG/DL (ref 0.1–1.5)
BUN SERPL-MCNC: 13 MG/DL (ref 8–22)
CALCIUM ALBUM COR SERPL-MCNC: 8.6 MG/DL (ref 8.5–10.5)
CALCIUM SERPL-MCNC: 8.8 MG/DL (ref 8.5–10.5)
CHLORIDE SERPL-SCNC: 105 MMOL/L (ref 96–112)
CO2 SERPL-SCNC: 23 MMOL/L (ref 20–33)
CREAT SERPL-MCNC: 0.78 MG/DL (ref 0.5–1.4)
GFR SERPLBLD CREATININE-BSD FMLA CKD-EPI: 116 ML/MIN/1.73 M 2
GLOBULIN SER CALC-MCNC: 3.1 G/DL (ref 1.9–3.5)
GLUCOSE SERPL-MCNC: 94 MG/DL (ref 65–99)
POTASSIUM SERPL-SCNC: 4.4 MMOL/L (ref 3.6–5.5)
PROT SERPL-MCNC: 7.3 G/DL (ref 6–8.2)
SODIUM SERPL-SCNC: 139 MMOL/L (ref 135–145)

## 2025-03-21 NOTE — TELEPHONE ENCOUNTER
----- Message from Physician Heriberto Pride M.D. sent at 3/20/2025  6:34 PM PDT -----  Renal function stable, blood counts fine, liver enzyme slightly elevated this may be elevated due to alcohol use, recent exercise.    Consider repeat testing in 3 months.  CMP ordered    Thank you  Dr. Pride

## 2025-03-21 NOTE — TELEPHONE ENCOUNTER
Phone Number Called: [phone]    Call outcome: Spoke to patient regarding message below.    Message: patient understood notes and will call back to schedule an appointment to talk about his ultrasound results.  Thank you.  Gela Araujo, Med Ass't

## 2025-03-24 ENCOUNTER — TELEPHONE (OUTPATIENT)
Dept: HEALTH INFORMATION MANAGEMENT | Facility: OTHER | Age: 40
End: 2025-03-24
Payer: COMMERCIAL

## 2025-04-01 VITALS
HEART RATE: 89 BPM | TEMPERATURE: 98 F | SYSTOLIC BLOOD PRESSURE: 118 MMHG | WEIGHT: 260 LBS | DIASTOLIC BLOOD PRESSURE: 74 MMHG | HEIGHT: 70 IN | OXYGEN SATURATION: 94 % | BODY MASS INDEX: 37.22 KG/M2

## 2025-04-01 DIAGNOSIS — E66.01 SEVERE OBESITY (HCC): ICD-10-CM

## 2025-04-01 DIAGNOSIS — E78.5 DYSLIPIDEMIA: ICD-10-CM

## 2025-04-01 PROCEDURE — 3078F DIAST BP <80 MM HG: CPT

## 2025-04-01 PROCEDURE — 1126F AMNT PAIN NOTED NONE PRSNT: CPT

## 2025-04-01 PROCEDURE — G0438 PPPS, INITIAL VISIT: HCPCS

## 2025-04-01 PROCEDURE — 3074F SYST BP LT 130 MM HG: CPT

## 2025-04-01 SDOH — ECONOMIC STABILITY: FOOD INSECURITY: WITHIN THE PAST 12 MONTHS, THE FOOD YOU BOUGHT JUST DIDN'T LAST AND YOU DIDN'T HAVE MONEY TO GET MORE.: NEVER TRUE

## 2025-04-01 SDOH — ECONOMIC STABILITY: FOOD INSECURITY: HOW HARD IS IT FOR YOU TO PAY FOR THE VERY BASICS LIKE FOOD, HOUSING, MEDICAL CARE, AND HEATING?: NOT HARD AT ALL

## 2025-04-01 SDOH — ECONOMIC STABILITY: HOUSING INSECURITY: IN THE LAST 12 MONTHS, WAS THERE A TIME WHEN YOU WERE NOT ABLE TO PAY THE MORTGAGE OR RENT ON TIME?: NO

## 2025-04-01 SDOH — ECONOMIC STABILITY: FOOD INSECURITY: WITHIN THE PAST 12 MONTHS, YOU WORRIED THAT YOUR FOOD WOULD RUN OUT BEFORE YOU GOT THE MONEY TO BUY MORE.: NEVER TRUE

## 2025-04-01 SDOH — ECONOMIC STABILITY: HOUSING INSECURITY: AT ANY TIME IN THE PAST 12 MONTHS, WERE YOU HOMELESS OR LIVING IN A SHELTER (INCLUDING NOW)?: NO

## 2025-04-01 SDOH — ECONOMIC STABILITY: TRANSPORTATION INSECURITY: IN THE PAST 12 MONTHS, HAS LACK OF TRANSPORTATION KEPT YOU FROM MEDICAL APPOINTMENTS OR FROM GETTING MEDICATIONS?: NO

## 2025-04-01 SDOH — ECONOMIC STABILITY: HOUSING INSECURITY: IN THE PAST 12 MONTHS, HOW MANY TIMES HAVE YOU MOVED WHERE YOU WERE LIVING?: 0

## 2025-04-01 ASSESSMENT — ACTIVITIES OF DAILY LIVING (ADL)
LACK_OF_TRANSPORTATION: NO
BATHING_REQUIRES_ASSISTANCE: 0

## 2025-04-01 ASSESSMENT — PATIENT HEALTH QUESTIONNAIRE - PHQ9: CLINICAL INTERPRETATION OF PHQ2 SCORE: 0

## 2025-04-01 ASSESSMENT — FIBROSIS 4 INDEX: FIB4 SCORE: 0.68

## 2025-04-01 ASSESSMENT — PAIN SCALES - GENERAL: PAINLEVEL_OUTOF10: NO PAIN

## 2025-04-01 ASSESSMENT — ENCOUNTER SYMPTOMS: GENERAL WELL-BEING: GOOD

## 2025-04-01 NOTE — PROGRESS NOTES
Comprehensive Health Assessment Program     Bao Barrera is a 39 y.o. here for his comprehensive health assessment.    Patient Active Problem List    Diagnosis Date Noted    Dyslipidemia 11/07/2024    Severe obesity (HCC) 11/07/2024    Hidradenitis suppurativa 10/03/2024    Verruca vulgaris 10/03/2024    Testicular pain, right 07/03/2024    Rash and nonspecific skin eruption 07/03/2024    Body mass index (BMI) of 39.0-39.9 in adult 07/03/2024       Current Outpatient Medications   Medication Sig Dispense Refill    clindamycin (CLINDAGEL) 1 % Gel Apply 1 g topically 2 times a day. Apply to left axilla 60 g 5    imiquimod (ALDARA) 5 % cream Apply thin layer to affected area on right hands Monday through Friday until follow up 24 Each 0    triamcinolone acetonide (KENALOG) 0.1 % Cream Apply 1 g topically 2 times a day. Right hand 45 g 1    metFORMIN ER (GLUCOPHAGE XR) 500 MG TABLET SR 24 HR Take 1 Tablet by mouth every day. (Patient not taking: Reported on 4/1/2025) 90 Tablet 3     No current facility-administered medications for this visit.          Current supplements as per medication list.     Allergies:   Patient has no known allergies.  Social History     Tobacco Use    Smoking status: Never    Smokeless tobacco: Never    Tobacco comments:     Very rare occasion 1-2 cigarette per year   Vaping Use    Vaping status: Never Used   Substance Use Topics    Alcohol use: Yes     Comment: occa    Drug use: No     Family History   Problem Relation Age of Onset    Heart Disease Mother     Hypertension Mother     Diabetes Mother     Stroke Neg Hx      Bao  has a past medical history of Dyslipidemia (11/7/2024).    He has no past medical history of Asthma, Cancer (HCC), Diabetes (HCC), or Hypertension.   History reviewed. No pertinent surgical history.      Depression Screening  Little interest or pleasure in doing things?  0 - not at all  Feeling down, depressed , or hopeless? 0 - not at all  Patient Health  Questionnaire Score: 0     If depressive symptoms identified deferred to follow up visit unless specifically addressed in assessment and plan.    Interpretation of PHQ-9 Total Score   Score Severity   1-4 No Depression   5-9 Mild Depression   10-14 Moderate Depression   15-19 Moderately Severe Depression   20-27 Severe Depression    Fall Risk Assessment  Has the patient had two or more falls in the last year or any fall with injury in the last year?  No    Safety Assessment  Do you always wear your seatbelt?  Yes  Any changes to home needed to function safely? No  Difficulty hearing.  No  Patient counseled about all safety risks that were identified.    Functional Assessment ADLs  Are there any barriers preventing you from cooking for yourself or meeting nutritional needs?  No.    Are there any barriers preventing you from driving safely or obtaining transportation?  No.    Are there any barriers preventing you from using a telephone or calling for help?  No    Are there any barriers preventing you from shopping?  No.    Are there any barriers preventing you from taking care of your own finances?  No    Are there any barriers preventing you from managing your medications?  No    Are there any barriers preventing you from showering, bathing or dressing yourself? No    Are there any barriers preventing you from doing housework or laundry? No  Are there any barriers preventing you from using the toilet?No  Are you currently engaging in any exercise or physical activity?  No.      Self-Assessment of Health  What is your perception of your health? Good    Do you sleep more than six hours a night? Yes    In the past 7 days, how much did pain keep you from doing your normal work? None    Do you spend quality time with family or friends (virtually or in person)? Yes    Do you usually eat a heart healthy diet that constists of a variety of fruits, vegetables, whole grains and fiber? No    Do you eat foods high in fat and/or  Fast Food more than three times per week? Yes    How concerned are you that your medical conditions are not being well managed?      Are you worried that in the next 2 months, you may not have stable housing that you own, rent, or stay in as part of a household? No      Advance Care Planning  Do you have an Advance Directive, Living Will, Durable Power of , or POLST? No                 Health Maintenance Summary            Current Care Gaps       HIV Screening (Once) Never done     No completion history exists for this topic.              Hepatitis C Screening (Once) Never done     No completion history exists for this topic.              Chickenpox Vaccine (Varicella) (1 of 2 - 13+ 2-dose series) Never done     No completion history exists for this topic.              Hepatitis B Vaccine (Hep B) (1 of 3 - 19+ 3-dose series) Never done     No completion history exists for this topic.              IMM DTaP/Tdap/Td Vaccine (1 - Tdap) Never done     No completion history exists for this topic.              COVID-19 Vaccine (1 - 2024-25 season) Never done     No completion history exists for this topic.                      Upcoming       Influenza Vaccine (Season Ended) Next due on 9/1/2025     No completion history exists for this topic.                      Completed or No Longer Recommended       Hepatitis A Vaccine (Hep A) (Series Information) Aged Out     No completion history exists for this topic.              HPV Vaccines (Series Information) Aged Out     No completion history exists for this topic.              Polio Vaccine (Inactivated Polio) (Series Information) Aged Out     No completion history exists for this topic.              Meningococcal Immunization (Series Information) Aged Out     No completion history exists for this topic.              Pneumococcal Vaccine: 0-49 Years (Series Information) Aged Out     No completion history exists for this topic.                            Patient Care  "Team:  Heriberto Pride M.D. as PCP - General (Internal Medicine)      Financial Resource Strain: Low Risk  (4/1/2025)    Overall Financial Resource Strain (CARDIA)     Difficulty of Paying Living Expenses: Not hard at all      Transportation Needs: No Transportation Needs (4/1/2025)    PRAPARE - Transportation     Lack of Transportation (Medical): No     Lack of Transportation (Non-Medical): No      Food Insecurity: No Food Insecurity (4/1/2025)    Hunger Vital Sign     Worried About Running Out of Food in the Last Year: Never true     Ran Out of Food in the Last Year: Never true     Intimate Partner Violence: Not on file         Encounter Vitals  Temperature: 36.7 °C (98 °F)  Temp src: Temporal  Blood Pressure: 118/74  Pulse: 89  Pulse Oximetry: 94 %  O2 Delivery Device: None - Room Air  Weight: 118 kg (260 lb)  Height: 177.8 cm (5' 10\")  BMI (Calculated): 37.31  Pain Score: No pain  DME  O2 Delivery Device: None - Room Air     ROS:  No fever, chills, nausea, vomiting, diarrhea, chest pain or shortness of breath. See HPI.    Physical Exam:  Constitutional: NAD  HENMT: NC/AT, PERRLA, EOMI, OP clear, TM's clear, no lymphadenopathy, no thyromegaly.  No JVD.  Cardiovascular: RRR, No m/r/g  Lungs: CTAB, no w/r/r  Extremities: 2+ DP, PT and Radial pulses bilaterally. No c/c/e  Skin: No legions notes  Neurologic: Alert & oriented x3, CN II-XII grossly intact    Assessment and Plan. The following treatment and monitoring plan is recommended:  Severe obesity (HCC)  Chronic, stable. BMI 37.31.  Weight 260 lb.  Discussed eating a diet that contains many vegetables, fruits, and whole grains; includes low-fat dairy products, poultry,  fish, beans, non-tropical vegetable oils and nuts; and limit intake of sweets, sugar-sweetened drinks and red  meats.   Follow-up at least annually.      Dyslipidemia   Latest Reference Range & Units 07/20/24 10:57   Cholesterol,Tot 100 - 199 mg/dL 194   Triglycerides 0 - 149 mg/dL 127   HDL " >=40 mg/dL 44   LDL <100 mg/dL 125 (H)   (H): Data is abnormally high    Chronic, stable. No current treatment. The patient is working on his diet and is exercising regularly.  Follow-up at least annually.      Body mass index (BMI) of 39.0-39.9 in adult  Chronic, stable. BMI 37.31.  Weight 260 lb.  Discussed eating a diet that contains many vegetables, fruits, and whole grains; includes low-fat dairy products, poultry,  fish, beans, non-tropical vegetable oils and nuts; and limit intake of sweets, sugar-sweetened drinks and red  meats.   Follow-up at least annually.      Services suggested: No services needed at this time    Follow-up: Return for appointment with Primary Care Provider as needed.

## 2025-04-02 NOTE — ASSESSMENT & PLAN NOTE
Chronic, stable. BMI 37.31.  Weight 260 lb.  Discussed eating a diet that contains many vegetables, fruits, and whole grains; includes low-fat dairy products, poultry,  fish, beans, non-tropical vegetable oils and nuts; and limit intake of sweets, sugar-sweetened drinks and red  meats.   Follow-up at least annually.

## 2025-04-02 NOTE — ASSESSMENT & PLAN NOTE
Latest Reference Range & Units 07/20/24 10:57   Cholesterol,Tot 100 - 199 mg/dL 194   Triglycerides 0 - 149 mg/dL 127   HDL >=40 mg/dL 44   LDL <100 mg/dL 125 (H)   (H): Data is abnormally high    Chronic, stable. No current treatment. The patient is working on his diet and is exercising regularly.  Follow-up at least annually.

## 2025-04-16 DIAGNOSIS — R10.32 ABDOMINAL PAIN, BILATERAL LOWER QUADRANT: ICD-10-CM

## 2025-04-16 DIAGNOSIS — R10.31 ABDOMINAL PAIN, BILATERAL LOWER QUADRANT: ICD-10-CM

## 2025-04-17 ENCOUNTER — HOSPITAL ENCOUNTER (OUTPATIENT)
Dept: RADIOLOGY | Facility: MEDICAL CENTER | Age: 40
End: 2025-04-17
Attending: STUDENT IN AN ORGANIZED HEALTH CARE EDUCATION/TRAINING PROGRAM
Payer: COMMERCIAL

## 2025-04-17 ENCOUNTER — RESULTS FOLLOW-UP (OUTPATIENT)
Dept: MEDICAL GROUP | Facility: MEDICAL CENTER | Age: 40
End: 2025-04-17

## 2025-04-17 DIAGNOSIS — R10.32 ABDOMINAL PAIN, BILATERAL LOWER QUADRANT: ICD-10-CM

## 2025-04-17 DIAGNOSIS — R10.31 ABDOMINAL PAIN, BILATERAL LOWER QUADRANT: ICD-10-CM

## 2025-04-17 PROCEDURE — 76857 US EXAM PELVIC LIMITED: CPT
